# Patient Record
Sex: FEMALE | Race: BLACK OR AFRICAN AMERICAN | Employment: OTHER | URBAN - METROPOLITAN AREA
[De-identification: names, ages, dates, MRNs, and addresses within clinical notes are randomized per-mention and may not be internally consistent; named-entity substitution may affect disease eponyms.]

---

## 2021-04-21 ENCOUNTER — HOSPITAL ENCOUNTER (EMERGENCY)
Age: 56
Discharge: HOME | End: 2021-04-21
Attending: EMERGENCY MEDICINE
Payer: MEDICARE

## 2021-04-21 ENCOUNTER — APPOINTMENT (OUTPATIENT)
Dept: GENERAL RADIOLOGY | Age: 56
End: 2021-04-21
Attending: EMERGENCY MEDICINE
Payer: MEDICARE

## 2021-04-21 VITALS
SYSTOLIC BLOOD PRESSURE: 127 MMHG | DIASTOLIC BLOOD PRESSURE: 87 MMHG | RESPIRATION RATE: 22 BRPM | OXYGEN SATURATION: 100 % | TEMPERATURE: 98.4 F | BODY MASS INDEX: 24.55 KG/M2 | WEIGHT: 130 LBS | HEIGHT: 61 IN | HEART RATE: 79 BPM

## 2021-04-21 DIAGNOSIS — F43.9 STRESS: ICD-10-CM

## 2021-04-21 DIAGNOSIS — E86.0 DEHYDRATION: ICD-10-CM

## 2021-04-21 DIAGNOSIS — T73.3XXA FATIGUE DUE TO EXCESSIVE EXERTION, INITIAL ENCOUNTER: Primary | ICD-10-CM

## 2021-04-21 LAB
ALBUMIN SERPL-MCNC: 4.2 G/DL (ref 3.4–5)
ALBUMIN/GLOB SERPL: 1.2 {RATIO} (ref 0.8–1.7)
ALP SERPL-CCNC: 93 U/L (ref 45–117)
ALT SERPL-CCNC: 16 U/L (ref 13–56)
AMPHET UR QL SCN: NEGATIVE
ANION GAP SERPL CALC-SCNC: 6 MMOL/L (ref 3–18)
APPEARANCE UR: CLEAR
AST SERPL-CCNC: 13 U/L (ref 10–38)
BACTERIA URNS QL MICRO: ABNORMAL /HPF
BARBITURATES UR QL SCN: NEGATIVE
BASOPHILS # BLD: 0 K/UL (ref 0–0.1)
BASOPHILS NFR BLD: 0 % (ref 0–2)
BENZODIAZ UR QL: NEGATIVE
BILIRUB SERPL-MCNC: 0.5 MG/DL (ref 0.2–1)
BILIRUB UR QL: NEGATIVE
BUN SERPL-MCNC: 20 MG/DL (ref 7–18)
BUN/CREAT SERPL: 30 (ref 12–20)
CALCIUM SERPL-MCNC: 9.7 MG/DL (ref 8.5–10.1)
CANNABINOIDS UR QL SCN: POSITIVE
CHLORIDE SERPL-SCNC: 105 MMOL/L (ref 100–111)
CK MB CFR SERPL CALC: 0.8 % (ref 0–4)
CK MB SERPL-MCNC: 1.1 NG/ML (ref 5–25)
CK SERPL-CCNC: 143 U/L (ref 26–192)
CO2 SERPL-SCNC: 26 MMOL/L (ref 21–32)
COCAINE UR QL SCN: NEGATIVE
COLOR UR: YELLOW
CREAT SERPL-MCNC: 0.67 MG/DL (ref 0.6–1.3)
DIFFERENTIAL METHOD BLD: NORMAL
EOSINOPHIL # BLD: 0.1 K/UL (ref 0–0.4)
EOSINOPHIL NFR BLD: 1 % (ref 0–5)
EPITH CASTS URNS QL MICRO: ABNORMAL /LPF (ref 0–5)
ERYTHROCYTE [DISTWIDTH] IN BLOOD BY AUTOMATED COUNT: 13.7 % (ref 11.6–14.5)
GLOBULIN SER CALC-MCNC: 3.4 G/DL (ref 2–4)
GLUCOSE SERPL-MCNC: 100 MG/DL (ref 74–99)
GLUCOSE UR STRIP.AUTO-MCNC: NEGATIVE MG/DL
HCT VFR BLD AUTO: 43 % (ref 35–45)
HDSCOM,HDSCOM: ABNORMAL
HGB BLD-MCNC: 13.4 G/DL (ref 12–16)
HGB UR QL STRIP: ABNORMAL
KETONES UR QL STRIP.AUTO: 15 MG/DL
LEUKOCYTE ESTERASE UR QL STRIP.AUTO: NEGATIVE
LIPASE SERPL-CCNC: 132 U/L (ref 73–393)
LYMPHOCYTES # BLD: 1.8 K/UL (ref 0.9–3.6)
LYMPHOCYTES NFR BLD: 23 % (ref 21–52)
MCH RBC QN AUTO: 27.4 PG (ref 24–34)
MCHC RBC AUTO-ENTMCNC: 31.2 G/DL (ref 31–37)
MCV RBC AUTO: 87.9 FL (ref 74–97)
METHADONE UR QL: NEGATIVE
MONOCYTES # BLD: 0.4 K/UL (ref 0.05–1.2)
MONOCYTES NFR BLD: 6 % (ref 3–10)
NEUTS SEG # BLD: 5.4 K/UL (ref 1.8–8)
NEUTS SEG NFR BLD: 70 % (ref 40–73)
NITRITE UR QL STRIP.AUTO: NEGATIVE
OPIATES UR QL: NEGATIVE
PCP UR QL: NEGATIVE
PH UR STRIP: 5 [PH] (ref 5–8)
PLATELET # BLD AUTO: 259 K/UL (ref 135–420)
PMV BLD AUTO: 10.5 FL (ref 9.2–11.8)
POTASSIUM SERPL-SCNC: 3.7 MMOL/L (ref 3.5–5.5)
PROT SERPL-MCNC: 7.6 G/DL (ref 6.4–8.2)
PROT UR STRIP-MCNC: NEGATIVE MG/DL
RBC # BLD AUTO: 4.89 M/UL (ref 4.2–5.3)
RBC #/AREA URNS HPF: ABNORMAL /HPF (ref 0–5)
SODIUM SERPL-SCNC: 137 MMOL/L (ref 136–145)
SP GR UR REFRACTOMETRY: 1.01 (ref 1–1.03)
TROPONIN I SERPL-MCNC: <0.02 NG/ML (ref 0–0.04)
TSH SERPL DL<=0.05 MIU/L-ACNC: 0.39 UIU/ML (ref 0.36–3.74)
UROBILINOGEN UR QL STRIP.AUTO: 1 EU/DL (ref 0.2–1)
WBC # BLD AUTO: 7.8 K/UL (ref 4.6–13.2)
WBC URNS QL MICRO: ABNORMAL /HPF (ref 0–5)

## 2021-04-21 PROCEDURE — 84484 ASSAY OF TROPONIN QUANT: CPT

## 2021-04-21 PROCEDURE — 74011250636 HC RX REV CODE- 250/636: Performed by: EMERGENCY MEDICINE

## 2021-04-21 PROCEDURE — 80053 COMPREHEN METABOLIC PANEL: CPT

## 2021-04-21 PROCEDURE — 71045 X-RAY EXAM CHEST 1 VIEW: CPT

## 2021-04-21 PROCEDURE — 83690 ASSAY OF LIPASE: CPT

## 2021-04-21 PROCEDURE — 99285 EMERGENCY DEPT VISIT HI MDM: CPT

## 2021-04-21 PROCEDURE — 85025 COMPLETE CBC W/AUTO DIFF WBC: CPT

## 2021-04-21 PROCEDURE — 96374 THER/PROPH/DIAG INJ IV PUSH: CPT

## 2021-04-21 PROCEDURE — 80307 DRUG TEST PRSMV CHEM ANLYZR: CPT

## 2021-04-21 PROCEDURE — 84443 ASSAY THYROID STIM HORMONE: CPT

## 2021-04-21 PROCEDURE — 96361 HYDRATE IV INFUSION ADD-ON: CPT

## 2021-04-21 PROCEDURE — 93005 ELECTROCARDIOGRAM TRACING: CPT

## 2021-04-21 PROCEDURE — 81001 URINALYSIS AUTO W/SCOPE: CPT

## 2021-04-21 RX ORDER — HYDROXYZINE 50 MG/1
50 TABLET, FILM COATED ORAL
Qty: 20 TAB | Refills: 0 | Status: SHIPPED | OUTPATIENT
Start: 2021-04-21 | End: 2021-05-11

## 2021-04-21 RX ORDER — KETOROLAC TROMETHAMINE 30 MG/ML
30 INJECTION, SOLUTION INTRAMUSCULAR; INTRAVENOUS
Status: COMPLETED | OUTPATIENT
Start: 2021-04-21 | End: 2021-04-21

## 2021-04-21 RX ORDER — KETOROLAC TROMETHAMINE 10 MG/1
10 TABLET, FILM COATED ORAL
Qty: 20 TAB | Refills: 0 | Status: SHIPPED | OUTPATIENT
Start: 2021-04-21 | End: 2021-04-26

## 2021-04-21 RX ADMIN — SODIUM CHLORIDE, SODIUM LACTATE, POTASSIUM CHLORIDE, AND CALCIUM CHLORIDE 1000 ML: 600; 310; 30; 20 INJECTION, SOLUTION INTRAVENOUS at 07:20

## 2021-04-21 RX ADMIN — KETOROLAC TROMETHAMINE 30 MG: 30 INJECTION, SOLUTION INTRAMUSCULAR at 07:20

## 2021-04-21 NOTE — ED NOTES
Report given to [facility]. Patient in NAD. VSS. Easy WOB. AOX4. Bed low and locked. Call bell within reach.

## 2021-04-21 NOTE — ED TRIAGE NOTES
Patient arrives ambulatory from home c/o frontal headache x2 weeks. Patient states she is under a lot of stress and has not been taking care of herself. Sudafed allergy and aleve OTC with no relief. Associated symptoms include abd pain and chest pain that started x3 months ago. AOx4. No medical history.

## 2021-04-21 NOTE — LETTER
[facility] [place] 
THE [facility] EMERGENCY DEPT 
[address] [address] [phone] Work/School Note Date: 4/21/2021 To Whom It May concern: 
 
Viviane Clarke was seen and treated today in the emergency room by the following provider(s): 
Attending Provider: Key Julio DO. Viviane Clarke may return to work on 4/26/2021.  
 
Sincerely, 
 
 
 
 
Alexander Peraza DO

## 2021-04-21 NOTE — ED PROVIDER NOTES
EMERGENCY DEPARTMENT HISTORY AND PHYSICAL EXAM    Date: 4/21/2021  Patient Name: Delta Sr    History of Presenting Illness     Chief Complaint   Patient presents with    Headache         History Provided By: Patient    Delta Sr is a 63 y.o. female who presents to the emergency department C/O multiple complaints. Patient states she has been having headache for 2 weeks and chest pain and abdominal pain intermittently for 3 months. She states that this time the chest and abdominal pain are not present and is not the primary reason for her visit today. She states the headache is mostly the reason that she is here. States it comes and goes as well and is only mild at this time. States it is located across the front of her head feels like a tight band. She states has been undergoing a lot of stress recently due to a CPS related issue within her family forcing her to have to take care of 4 of the grandchildren. She states this is also forced her to go back to work and balancing all of this has been very taxing on her body.   She denies any homicidal or suicidal ideation        PCP: Alondra Varghese MD        Past History     Past Medical History:  Past Medical History:   Diagnosis Date    Bipolar 1 disorder ([address]     Incisional hernia     PTSD (post-traumatic stress disorder)     Thyroid disease     waiting to see endocrinologist, recent test showed it was low, no diagnosis yet or meds       Past Surgical History:  Past Surgical History:   Procedure Laterality Date    HX HYSTERECTOMY      HX OTHER SURGICAL Bilateral     gland removal    HX OTHER SURGICAL      tummy tuck    HX TUBAL LIGATION         Family History:  Family History   Problem Relation Age of Onset    Cancer Maternal Grandmother     Diabetes Maternal Grandmother     Kidney Disease Father     Heart Disease Maternal Grandfather        Social History:  Social History     Tobacco Use    Smoking status: Former Smoker     Packs/day: 1.00     Years: 20.00     Pack years: 20.00     Types: Cigarettes     Quit date: 2016     Years since quittin.4   Substance Use Topics    Alcohol use: Yes     Alcohol/week: 0.0 standard drinks     Comment: occasionally    Drug use: Yes     Types: Marijuana       Allergies:  No Known Allergies      Review of Systems   Review of Systems   Constitutional: Negative for fever. Respiratory: Negative for shortness of breath. Cardiovascular: Positive for chest pain. Gastrointestinal: Positive for abdominal pain. Negative for nausea and vomiting. Neurological: Positive for headaches. All other systems reviewed and are negative. All other systems reviewed and are negative.     Physical Exam     Vitals:    21 0643 21 0645 21 0700 21 0707   BP: 123/84  127/87    Pulse: 83  90    Resp: 20  26    Temp: 98.4 °F (36.9 °C)      SpO2: 98% 98% 100% 100%   Weight: 59 kg (130 lb)      Height: 5' 1\" (1.549 m)        Physical Exam    Nursing notes and vital signs reviewed    Airway: intact, speaking normally  Breathing: No apparent distress, no cyanosis  Circulation: Peripheral pulses equal    Constitutional: Non toxic appearing, no acute distress, appearing stated age  Head: Normocephalic, Atraumatic  Eyes: PERRL, EOMI, No conjunctival injection  Ears: external ears normal  Nose: No rhinorrhea, external nose normal  Throat: mucous membranes moist  Neck: symmetric, trachea midline, no obvious swelling, no JVD, no obvious deformity  Cardiovascular: Regular rate and rhythm, no murmurs  Chest: No palpable tenderness, structural deformity or crepitus  Lungs: Clear to ausculation bilaterally, No stridor, Normal work of breathing and chest excursion bilaterally  Abdomen: Soft, non tender, non distended, normoactive bowel sounds, No rigidity, no peritoneal signs  Musculoskeletal:  No evidence of obvious deformity to the back, extremities, no LE edema  Skin: Warm, dry, No obvious rashes  Neuro: Alert and oriented x 3, CN 2-12 intact, normal speech, strength and sensation full and symmetric bilaterally  Psychiatric: Normal mood and affect      Diagnostic Study Results     Labs -     Recent Results (from the past 72 hour(s))   EKG, 12 LEAD, INITIAL    Collection Time: 04/21/21  6:41 AM   Result Value Ref Range    Ventricular Rate 65 BPM    Atrial Rate 65 BPM    P-R Interval 128 ms    QRS Duration 74 ms    Q-T Interval 368 ms    QTC Calculation (Bezet) 382 ms    Calculated P Axis 75 degrees    Calculated R Axis 67 degrees    Calculated T Axis 61 degrees    Diagnosis       Normal sinus rhythm with sinus arrhythmia  Possible Left atrial enlargement  Borderline ECG  When compared with ECG of 03-DEC-2010 14:32,  No significant change was found     CBC WITH AUTOMATED DIFF    Collection Time: 04/21/21  6:45 AM   Result Value Ref Range    WBC 7.8 4.6 - 13.2 K/uL    RBC 4.89 4.20 - 5.30 M/uL    HGB 13.4 12.0 - 16.0 g/dL    HCT 43.0 35.0 - 45.0 %    MCV 87.9 74.0 - 97.0 FL    MCH 27.4 24.0 - 34.0 PG    MCHC 31.2 31.0 - 37.0 g/dL    RDW 13.7 11.6 - 14.5 %    PLATELET 970 580 - 873 K/uL    MPV 10.5 9.2 - 11.8 FL    NEUTROPHILS 70 40 - 73 %    LYMPHOCYTES 23 21 - 52 %    MONOCYTES 6 3 - 10 %    EOSINOPHILS 1 0 - 5 %    BASOPHILS 0 0 - 2 %    ABS. NEUTROPHILS 5.4 1.8 - 8.0 K/UL    ABS. LYMPHOCYTES 1.8 0.9 - 3.6 K/UL    ABS. MONOCYTES 0.4 0.05 - 1.2 K/UL    ABS. EOSINOPHILS 0.1 0.0 - 0.4 K/UL    ABS.  BASOPHILS 0.0 0.0 - 0.1 K/UL    DF AUTOMATED     METABOLIC PANEL, COMPREHENSIVE    Collection Time: 04/21/21  6:45 AM   Result Value Ref Range    Sodium 137 136 - 145 mmol/L    Potassium 3.7 3.5 - 5.5 mmol/L    Chloride 105 100 - 111 mmol/L    CO2 26 21 - 32 mmol/L    Anion gap 6 3.0 - 18 mmol/L    Glucose 100 (H) 74 - 99 mg/dL    BUN 20 (H) 7.0 - 18 MG/DL    Creatinine 0.67 0.6 - 1.3 MG/DL    BUN/Creatinine ratio 30 (H) 12 - 20      GFR est AA >60 >60 ml/min/1.73m2    GFR est non-AA >60 >60 ml/min/1.73m2    Calcium 9.7 8.5 - 10.1 MG/DL    Bilirubin, total 0.5 0.2 - 1.0 MG/DL    ALT (SGPT) 16 13 - 56 U/L    AST (SGOT) 13 10 - 38 U/L    Alk. phosphatase 93 45 - 117 U/L    Protein, total 7.6 6.4 - 8.2 g/dL    Albumin 4.2 3.4 - 5.0 g/dL    Globulin 3.4 2.0 - 4.0 g/dL    A-G Ratio 1.2 0.8 - 1.7     LIPASE    Collection Time: 04/21/21  6:45 AM   Result Value Ref Range    Lipase 132 73 - 393 U/L   CARDIAC PANEL,(CK, CKMB & TROPONIN)    Collection Time: 04/21/21  6:45 AM   Result Value Ref Range    CK - MB 1.1 <3.6 ng/ml    CK-MB Index 0.8 0.0 - 4.0 %     26 - 192 U/L    Troponin-I, QT <0.02 0.0 - 0.045 NG/ML       Radiologic Studies -   XR CHEST PORT   Final Result      No acute radiographic cardiopulmonary abnormality. CT Results  (Last 48 hours)    None        CXR Results  (Last 48 hours)               04/21/21 0700  XR CHEST PORT Final result    Impression:      No acute radiographic cardiopulmonary abnormality. Narrative:  EXAM: XR CHEST PORT       CLINICAL INDICATION/HISTORY: chest pain   -Additional: None       COMPARISON: 12/29/2014       TECHNIQUE: Frontal view of the chest       _______________       FINDINGS:       HEART AND MEDIASTINUM: Normal cardiac size and mediastinal contours. LUNGS AND PLEURAL SPACES: No focal pneumonic consolidation, pneumothorax, or   pleural effusion. BONY THORAX AND SOFT TISSUES: No acute osseous abnormality       _______________                 Medications given in the ED-  Medications   lactated ringers bolus infusion 1,000 mL (1,000 mL IntraVENous New Bag 4/21/21 0720)   ketorolac (TORADOL) injection 30 mg (30 mg IntraVENous Given 4/21/21 0720)         Medical Decision Making     I reviewed the vital signs, available nursing notes, past medical history, past surgical history, family history and social history. Vital Signs interpretation- I have reviewed the patient's vital signs.     Pulse Oximetry interpretation - 100% on Room air     Cardiac Monitor interpretation:  Rate: 90 bpm  Rhythm: sinus    EKG interpretation: (Preliminary)  EKG interpretation by Dr. Burleson Riding 64 normal sinus rhythm, normal axis, no specific ST changes    Records Reviewed: Nursing Notes and Old Medical Records    Procedures:  Procedures    ED Course & MDM:   Patient symptoms do likely appear related to her increased amount of stress. Will obtain blood work, chest x-ray EKG and give IV fluids and Toradol and reassess    Patient appears comfortable at this time. Her laboratory findings, chest x-ray and EKG are normal.    I discussed with the patient that her symptoms do appear like stress and likely overexertion and fatigue. Recommended prescribe medication as directed for headache and for sleep and to take several days off of work so as to help herself recuperating take time to care for herself. Recommended follow-up with her primary care physician otherwise come back to emergency department symptoms worsen. She understands and agrees to plan    Diagnosis and Disposition         DISCHARGE NOTE:    Pardeep De La Fuente's  results have been reviewed with her. She has been counseled regarding her diagnosis, treatment, and plan. She verbally conveys understanding and agreement of the signs, symptoms, diagnosis, treatment and prognosis and additionally agrees to follow up as discussed. She also agrees with the care-plan and conveys that all of her questions have been answered. I have also provided discharge instructions for her that include: educational information regarding their diagnosis and treatment, and list of reasons why they would want to return to the ED prior to their follow-up appointment, should her condition change. She has been provided with education for proper emergency department utilization. CLINICAL IMPRESSION:    1. Fatigue due to excessive exertion, initial encounter    2. Stress        PLAN:  1. D/C Home  2.    Current Discharge Medication List START taking these medications    Details   ketorolac (TORADOL) 10 mg tablet Take 1 Tab by mouth every six (6) hours as needed (headache) for up to 5 days. Qty: 20 Tab, Refills: 0      hydrOXYzine HCL (ATARAX) 50 mg tablet Take 1 Tab by mouth nightly for 20 days. Qty: 20 Tab, Refills: 0           3. Follow-up Information     Follow up With Specialties Details Why Contact Info    Juan Colon MD [address] [place]  [address] [place] [address] 21           _______________________________      Please note that this dictation was completed with [employer], the computer voice recognition software. Quite often unanticipated grammatical, syntax, homophones, and other interpretive errors are inadvertently transcribed by the computer software. Please disregard these errors. Please excuse any errors that have escaped final proofreading.

## 2021-04-21 NOTE — ED NOTES
Report received from IRMA Harp at this time. Patient resting with no complaints and instructed to provide urine sample. Patient states she her headache has resolved at this time.

## 2021-04-22 LAB
ATRIAL RATE: 65 BPM
CALCULATED P AXIS, ECG09: 75 DEGREES
CALCULATED R AXIS, ECG10: 67 DEGREES
CALCULATED T AXIS, ECG11: 61 DEGREES
DIAGNOSIS, 93000: NORMAL
P-R INTERVAL, ECG05: 128 MS
Q-T INTERVAL, ECG07: 368 MS
QRS DURATION, ECG06: 74 MS
QTC CALCULATION (BEZET), ECG08: 382 MS
VENTRICULAR RATE, ECG03: 65 BPM

## 2021-11-07 ENCOUNTER — HOSPITAL ENCOUNTER (EMERGENCY)
Age: 56
Discharge: HOME | End: 2021-11-07
Attending: EMERGENCY MEDICINE
Payer: MEDICARE

## 2021-11-07 ENCOUNTER — APPOINTMENT (OUTPATIENT)
Dept: GENERAL RADIOLOGY | Age: 56
End: 2021-11-07
Attending: EMERGENCY MEDICINE
Payer: MEDICARE

## 2021-11-07 VITALS
HEIGHT: 61 IN | DIASTOLIC BLOOD PRESSURE: 73 MMHG | TEMPERATURE: 98 F | SYSTOLIC BLOOD PRESSURE: 101 MMHG | RESPIRATION RATE: 16 BRPM | WEIGHT: 125 LBS | OXYGEN SATURATION: 98 % | HEART RATE: 81 BPM | BODY MASS INDEX: 23.6 KG/M2

## 2021-11-07 DIAGNOSIS — S93.401A SPRAIN OF RIGHT ANKLE, UNSPECIFIED LIGAMENT, INITIAL ENCOUNTER: ICD-10-CM

## 2021-11-07 DIAGNOSIS — M25.561 ACUTE PAIN OF RIGHT KNEE: ICD-10-CM
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
## 2021-11-07 PROCEDURE — 73564 X-RAY EXAM KNEE 4 OR MORE: CPT

## 2021-11-07 PROCEDURE — 73610 X-RAY EXAM OF ANKLE: CPT

## 2021-11-07 PROCEDURE — 99282 EMERGENCY DEPT VISIT SF MDM: CPT

## 2021-11-07 NOTE — Clinical Note
[facility] [place] 
THE [facility] EMERGENCY DEPT 
[address] [address] [phone] Work/School Note Date: 11/7/2021 To Whom It May concern: 
 
 
Haja Madera was seen and treated today in the emergency room by the following provider(s): 
Attending Provider: Patrick Love MD. Haja Madera is excused from work/school on 11/07/21. She is clear to return to work/school on 11/08/21. Sincerely, Liliya Amaral MD

## 2021-11-07 NOTE — Clinical Note
[facility] [place] 
[facility] 
[address] [address] [phone] Work/School Note Date: 11/7/2021 To Whom It May concern: 
 
 
Juan J Zabala was seen and treated today in the emergency room by the following provider(s): 
Attending Provider: Herve Carrion MD. Juan J Zabala is excused from work/school on 11/07/21. She is clear to return to work/school on 11/08/21. Sincerely, Kyle Padilla MD

## 2021-11-07 NOTE — ED PROVIDER NOTES
EMERGENCY DEPARTMENT HISTORY AND PHYSICAL EXAM    Date: 11/7/2021  Patient Name: Blanche Lira    History of Presenting Illness     Chief Complaint   Patient presents with   Alaina Senthil Fall         History Provided By: Patient      Blanche Lira is a 61 y.o. female with PMHX of arthritis, bipolar disorder, thyroid disease who presents to the emergency department C/O right knee pain, right ankle pain. Yesterday evening, patient was reaching up to get something out of a cabinet, she had grocery bags on the floor, when she came down she tripped over them landing on her right knee and ankle. Pain was minimal initially, however when she woke up she had worsened pain to her right knee along the medial aspect and the outside of her right ankle. She otherwise denies any associated weakness, numbness, tingling. She did not strike her head or lose consciousness, she is not on any anticoagulation.   Patient reports that pain is minimal and declines any pain medication at this time    PCP: Mony Dominguez MD        Past History     Past Medical History:  Past Medical History:   Diagnosis Date    Arthritis     B[address] 1 disorder ([facility] [address]     Incisional hernia     PTSD (post-traumatic stress disorder)     Thyroid disease     waiting to see endocrinologist, recent test showed it was low, no diagnosis yet or meds       Past Surgical History:  Past Surgical History:   Procedure Laterality Date    HX HYSTERECTOMY      HX HYSTERECTOMY      HX OTHER SURGICAL Bilateral     gland removal    HX OTHER SURGICAL      tummy tuck    HX TUBAL LIGATION      HX TUBAL LIGATION      IR ASP THYROID CYST         Family History:  Family History   Problem Relation Age of Onset    Cancer Maternal Grandmother     Diabetes Maternal Grandmother     Kidney Disease Father     Heart Disease Maternal Grandfather        Social History:  Social History     Tobacco Use    Smoking status: Former Smoker     Packs/day: 1.00     Years: 20.00     Pack years: 20. 00     Types: Cigarettes     Quit date: 2016     Years since quittin.9    Smokeless tobacco: Never Used   Substance Use Topics    Alcohol use: Yes     Alcohol/week: 0.0 standard drinks     Comment: occasionally    Drug use: Yes     Types: Marijuana       Allergies:  No Known Allergies      Review of Systems   Review of Systems   Constitutional: Negative for activity change and fever. HENT: Negative for congestion and sore throat. Eyes: Negative for discharge. Respiratory: Negative for apnea. Cardiovascular: Negative for chest pain. Gastrointestinal: Negative for abdominal distention. Genitourinary: Negative for dysuria and flank pain. Musculoskeletal: Positive for arthralgias. Skin: Negative for rash. Neurological: Negative for dizziness and weakness. Hematological: Negative for adenopathy. Psychiatric/Behavioral: Negative for agitation. All other systems reviewed and are negative.         Physical Exam     Vitals:    21 0430   BP: 101/73   Pulse: 81   Resp: 16   Temp: 98 °F (36.7 °C)   SpO2: 98%   Weight: 56.7 kg (125 lb)   Height: 5' 1\" (1.549 m)     Physical Exam    Nursing notes and vital signs reviewed    Constitutional: Non toxic appearing, moderate distress  Head: Normocephalic, Atraumatic  Eyes: EOMI  Neck: Supple  Cardiovascular: Regular rate and rhythm, no murmurs, rubs, or gallops  Chest: Normal work of breathing and chest excursion bilaterally  Lungs: Clear to ausculation bilaterally  Abdomen: Soft, non tender, non distended, normoactive bowel sounds  Back: No evidence of trauma or deformity  Extremities: No evidence of trauma or deformity, slight swelling and ecchymosis noted to the right knee, no significant valgus or varus instability, full range of passive and active range of motion with no crepitus, 2+ DP, PT, swelling to the lateral malleolus with tenderness  Skin: Warm and dry, normal cap refill  Neuro: Alert and appropriate, CN intact, normal speech, strength and sensation full and symmetric bilaterally, normal gait, normal coordination  Psychiatric: Normal mood and affect      Diagnostic Study Results     Labs -   No results found for this or any previous visit (from the past 12 hour(s)). Radiologic Studies -   XR KNEE RT MIN 4 V    (Results Pending)   XR ANKLE RT MIN 3 V    (Results Pending)     CT Results  (Last 48 hours)    None        CXR Results  (Last 48 hours)    None          Medications given in the ED-  Medications - No data to display      Medical Decision Making   I am the first provider for this patient. I reviewed the vital signs, available nursing notes, past medical history, past surgical history, family history and social history. Vital Signs-Reviewed the patient's vital signs. Pulse Oximetry Analysis - 98% on room air, not hypoxic     Records Reviewed: Old Medical Records    Provider Notes (Medical Decision Making): René Sadler is a 18-year-old female presents for evaluation of mechanical fall with right knee pain, right ankle pain that have worsened since yesterday. On arrival patient is afebrile, nontoxic-appearing and hemodynamically stable. Patient is otherwise neurovascularly intact. Plain film obtained and found to be negative for evidence of fracture, dislocation. Suspect likely sprain and contusion. Patient provided crutches for comfort with ambulation as tolerated with an Aircast.  Patient advised to follow-up with primary care provider, she continues to decline pain medication, patient discharged in stable condition. Procedures:  Procedures    ED Course:     Diagnosis and Disposition       DISCHARGE NOTE:    Akuamarc Ernie De La Fuente's  results have been reviewed with her. She has been counseled regarding her diagnosis, treatment, and plan. She verbally conveys understanding and agreement of the signs, symptoms, diagnosis, treatment and prognosis and additionally agrees to follow up as discussed.   She also agrees with the care-plan and conveys that all of her questions have been answered. I have also provided discharge instructions for her that include: educational information regarding their diagnosis and treatment, and list of reasons why they would want to return to the ED prior to their follow-up appointment, should her condition change. She has been provided with education for proper emergency department utilization. CLINICAL IMPRESSION:    1. Fall, initial encounter    2. Sprain of right ankle, unspecified ligament, initial encounter    3. Acute pain of right knee        PLAN:  1. D/C Home  2. There are no discharge medications for this patient. 3.   Follow-up Information     Follow up With Specialties Details Why Contact Info    Traci Holland MD Family Medicine   [address] [place] [id number] [phone]      THE [facility] EMERGENCY DEPT Emergency Medicine  As needed, If symptoms worsen [address] [address]  [phone]        _______________________________      Please note that this dictation was completed with [employer], the computer voice recognition software. Quite often unanticipated grammatical, syntax, homophones, and other interpretive errors are inadvertently transcribed by the computer software. Please disregard these errors. Please excuse any errors that have escaped final proofreading.

## 2021-11-07 NOTE — ED TRIAGE NOTES
Pt to Triage in a w/c. Cc: S/p Mechanical fall last night and hurt her right knee and ankle. Pain worse today.  PMH Arthritis in both joints

## 2022-03-19 PROBLEM — E86.0 DEHYDRATION: Status: ACTIVE | Noted: 2021-04-21

## 2022-03-19 PROBLEM — F43.9 STRESS: Status: ACTIVE | Noted: 2021-04-21

## 2022-03-19 PROBLEM — T73.3XXA FATIGUE DUE TO EXCESSIVE EXERTION: Status: ACTIVE | Noted: 2021-04-21

## 2023-11-02 ENCOUNTER — TRANSCRIBE ORDERS (OUTPATIENT)
Facility: HOSPITAL | Age: 58
End: 2023-11-02

## 2023-11-02 DIAGNOSIS — Z12.31 VISIT FOR SCREENING MAMMOGRAM: Primary | ICD-10-CM

## 2023-11-18 ENCOUNTER — HOSPITAL ENCOUNTER (OUTPATIENT)
Facility: HOSPITAL | Age: 58
End: 2023-11-18
Payer: COMMERCIAL

## 2023-11-18 VITALS — BODY MASS INDEX: 23.6 KG/M2 | HEIGHT: 61 IN | WEIGHT: 125 LBS

## 2023-11-18 DIAGNOSIS — Z12.31 VISIT FOR SCREENING MAMMOGRAM: ICD-10-CM

## 2023-11-18 PROCEDURE — 77063 BREAST TOMOSYNTHESIS BI: CPT

## 2025-05-08 ENCOUNTER — TRANSCRIBE ORDERS (OUTPATIENT)
Facility: HOSPITAL | Age: 60
End: 2025-05-08

## 2025-05-08 DIAGNOSIS — Z12.31 VISIT FOR SCREENING MAMMOGRAM: Primary | ICD-10-CM
